# Patient Record
Sex: FEMALE | Race: WHITE | NOT HISPANIC OR LATINO | Employment: FULL TIME | URBAN - METROPOLITAN AREA
[De-identification: names, ages, dates, MRNs, and addresses within clinical notes are randomized per-mention and may not be internally consistent; named-entity substitution may affect disease eponyms.]

---

## 2021-03-16 ENCOUNTER — TELEMEDICINE (OUTPATIENT)
Dept: INTERNAL MEDICINE | Facility: CLINIC | Age: 31
End: 2021-03-16

## 2021-03-16 DIAGNOSIS — I88.9 AXILLARY ADENITIS: Primary | ICD-10-CM

## 2021-03-16 PROCEDURE — 99203 OFFICE O/P NEW LOW 30 MIN: CPT | Performed by: INTERNAL MEDICINE

## 2021-03-16 NOTE — PROGRESS NOTES
Chief Complaint  No chief complaint on file.    Subjective          Jillian Tracy presents to [facility]  History of Present Illness  Nontender pea-sized lesion under the left arm.  She did get the MaDerna vaccine second shot on February 19.  No other real symptoms.  No hot flashes or night sweats weight loss or fevers.  It really has not changed.  No breast tenderness or breast lesions.You have chosen to receive care through a telephone visit.   You have chosen to receive care through a telehealth visit.  Do you consent to use a video/audio connection for your medical care today? Yes    Objective   Vital Signs:   There were no vitals taken for this visit.    Physical Exam very nice young lady.  Pleasant.  Conversive and understanding the discussion appropriately.  Result Review :                 Assessment and Plan sounds like axillary cystic lesion or folliculitis.  Not really painful.  Of course it could be lymph node although it sounds more superficial, she did have the Covid vaccine last month and appropriately understands that can give you some adenopathy.  No other breast lesions known.  She is going to monitor at the next couple of weeks, if it persist less check in the office.  Its not really tender at this point so antibiotics were deferred.  Call if any problems  There are no diagnoses linked to this encounter.    Follow Up   No follow-ups on file.  Patient was given instructions and counseling regarding her condition or for health maintenance advice. Please see specific information pulled into the AVS if appropriate.

## 2021-04-16 ENCOUNTER — BULK ORDERING (OUTPATIENT)
Dept: CASE MANAGEMENT | Facility: OTHER | Age: 31
End: 2021-04-16

## 2021-04-16 DIAGNOSIS — Z23 IMMUNIZATION DUE: ICD-10-CM
